# Patient Record
Sex: MALE
[De-identification: names, ages, dates, MRNs, and addresses within clinical notes are randomized per-mention and may not be internally consistent; named-entity substitution may affect disease eponyms.]

---

## 2019-03-13 ENCOUNTER — RX ONLY (OUTPATIENT)
Age: 49
Setting detail: RX ONLY
End: 2019-03-13

## 2019-03-13 ENCOUNTER — APPOINTMENT (RX ONLY)
Dept: RURAL CLINIC 1 | Facility: CLINIC | Age: 49
Setting detail: DERMATOLOGY
End: 2019-03-13

## 2019-03-13 DIAGNOSIS — L0391 CELLULITIS AND ABSCESS OF UNSPECIFIED SITES: ICD-10-CM | Status: RESOLVING

## 2019-03-13 DIAGNOSIS — L0390 CELLULITIS AND ABSCESS OF UNSPECIFIED SITES: ICD-10-CM | Status: RESOLVING

## 2019-03-13 DIAGNOSIS — L12.1 CICATRICIAL PEMPHIGOID: ICD-10-CM | Status: WELL CONTROLLED

## 2019-03-13 PROBLEM — I10 ESSENTIAL (PRIMARY) HYPERTENSION: Status: ACTIVE | Noted: 2019-03-13

## 2019-03-13 PROBLEM — J45.909 UNSPECIFIED ASTHMA, UNCOMPLICATED: Status: ACTIVE | Noted: 2019-03-13

## 2019-03-13 PROBLEM — L03.116 CELLULITIS OF LEFT LOWER LIMB: Status: ACTIVE | Noted: 2019-03-13

## 2019-03-13 PROBLEM — E78.5 HYPERLIPIDEMIA, UNSPECIFIED: Status: ACTIVE | Noted: 2019-03-13

## 2019-03-13 PROCEDURE — ? OTHER

## 2019-03-13 PROCEDURE — ? TREATMENT REGIMEN

## 2019-03-13 PROCEDURE — ? COUNSELING

## 2019-03-13 PROCEDURE — 99202 OFFICE O/P NEW SF 15 MIN: CPT

## 2019-03-13 RX ORDER — MINOCYCLINE HYDROCHLORIDE 100 MG/1
TABLET ORAL
Qty: 60 | Refills: 0 | Status: ERX | COMMUNITY
Start: 2019-03-13

## 2019-03-13 ASSESSMENT — LOCATION SIMPLE DESCRIPTION DERM
LOCATION SIMPLE: SOFT PALATE
LOCATION SIMPLE: LEFT THIGH

## 2019-03-13 ASSESSMENT — LOCATION DETAILED DESCRIPTION DERM
LOCATION DETAILED: LEFT SOFT PALATE
LOCATION DETAILED: LEFT ANTERIOR PROXIMAL THIGH

## 2019-03-13 ASSESSMENT — LOCATION ZONE DERM
LOCATION ZONE: LEG
LOCATION ZONE: MUCOUS_MEMBRANE

## 2019-03-13 NOTE — PROCEDURE: OTHER
Other (Free Text): Pt has already seen an ophthalmologist and he is continuing f/u with dental. Discussed with him that sometimes there are blisters in the esophagus and ENT referral should be considered as well
Note Text (......Xxx Chief Complaint.): This diagnosis correlates
Detail Level: Detailed
Other (Free Text): Pt was recently placed on Doxycycline and the infection is resolving. He states that this happened in January as well. Discussed with him that the next time it erupts, we will need to do a tissue biopsy to r/o atypical infectious processes. Pt works at a [employer]

## 2019-03-13 NOTE — PROCEDURE: TREATMENT REGIMEN
Detail Level: Zone
Continue Regimen: Triamcinolone dental paste prn flare\\nOphthalmology F/U\\nDental F/U

## 2019-03-13 NOTE — HPI: OTHER
Condition:: Eval and treat
Please Describe Your Condition:: Consult requested by Dr. Magdy Yadav, DO

## 2019-03-14 ENCOUNTER — RX ONLY (OUTPATIENT)
Age: 49
Setting detail: RX ONLY
End: 2019-03-14

## 2019-03-14 RX ORDER — MINOCYCLINE HYDROCHLORIDE 100 MG/1
CAPSULE ORAL
Qty: 60 | Refills: 0 | Status: ERX | COMMUNITY
Start: 2019-03-14

## 2024-04-30 NOTE — HPI: SKIN LESIONS
Have Your Skin Lesions Been Treated?: been treated
Is This A New Presentation, Or A Follow-Up?: Skin Lesions
Additional History: Pt was recently diagnosed with cicatricial pemphigoid
BMI: BMI (kg/m2): 25.4 (04-27-24 @ 00:19)  HbA1c: A1C with Estimated Average Glucose Result: 6.5 % (12-23-23 @ 08:00)    Glucose: POCT Blood Glucose.: 169 mg/dL (04-30-24 @ 06:33)    BP: 145/73 (04-30-24 @ 08:47) (120/70 - 169/82)Vital Signs Last 24 Hrs  T(C): 36.7 (04-30-24 @ 08:47), Max: 36.7 (04-30-24 @ 08:47)  T(F): 98.1 (04-30-24 @ 08:47), Max: 98.1 (04-30-24 @ 08:47)  HR: 116 (04-30-24 @ 08:47) (98 - 116)  BP: 145/73 (04-30-24 @ 08:47) (140/72 - 166/79)  BP(mean): --  RR: 16 (04-30-24 @ 08:47) (16 - 18)  SpO2: --      Lipid Panel: Date/Time: 12-23-23 @ 08:00  Cholesterol, Serum: 186  LDL Cholesterol Calculated: 100  HDL Cholesterol, Serum: 46  Total Cholesterol/HDL Ration Measurement: --  Triglycerides, Serum: 202  
BMI: BMI (kg/m2): 25.4 (04-27-24 @ 00:19)  HbA1c: A1C with Estimated Average Glucose Result: 6.5 % (12-23-23 @ 08:00)    Glucose: POCT Blood Glucose.: 169 mg/dL (04-30-24 @ 06:33)    BP: 145/73 (04-30-24 @ 08:47) (120/70 - 166/79)Vital Signs Last 24 Hrs  T(C): 36.7 (04-30-24 @ 08:47), Max: 36.7 (04-30-24 @ 08:47)  T(F): 98.1 (04-30-24 @ 08:47), Max: 98.1 (04-30-24 @ 08:47)  HR: 116 (04-30-24 @ 08:47) (98 - 116)  BP: 145/73 (04-30-24 @ 08:47) (140/72 - 166/79)  BP(mean): --  RR: 16 (04-30-24 @ 08:47) (16 - 18)  SpO2: --      Lipid Panel: Date/Time: 12-23-23 @ 08:00  Cholesterol, Serum: 186  LDL Cholesterol Calculated: 100  HDL Cholesterol, Serum: 46  Total Cholesterol/HDL Ration Measurement: --  Triglycerides, Serum: 202